# Patient Record
Sex: FEMALE
[De-identification: names, ages, dates, MRNs, and addresses within clinical notes are randomized per-mention and may not be internally consistent; named-entity substitution may affect disease eponyms.]

---

## 2022-04-18 PROBLEM — Z00.00 ENCOUNTER FOR PREVENTIVE HEALTH EXAMINATION: Status: ACTIVE | Noted: 2022-04-18

## 2022-04-25 ENCOUNTER — APPOINTMENT (OUTPATIENT)
Dept: NEUROSURGERY | Facility: CLINIC | Age: 82
End: 2022-04-25
Payer: MEDICARE

## 2022-04-25 ENCOUNTER — TRANSCRIPTION ENCOUNTER (OUTPATIENT)
Age: 82
End: 2022-04-25

## 2022-04-25 DIAGNOSIS — Z86.39 PERSONAL HISTORY OF OTHER ENDOCRINE, NUTRITIONAL AND METABOLIC DISEASE: ICD-10-CM

## 2022-04-25 DIAGNOSIS — Z86.79 PERSONAL HISTORY OF OTHER DISEASES OF THE CIRCULATORY SYSTEM: ICD-10-CM

## 2022-04-25 DIAGNOSIS — M50.30 OTHER CERVICAL DISC DEGENERATION, UNSPECIFIED CERVICAL REGION: ICD-10-CM

## 2022-04-25 PROCEDURE — 99203 OFFICE O/P NEW LOW 30 MIN: CPT | Mod: 95

## 2022-04-25 RX ORDER — APIXABAN 5 MG/1
5 TABLET, FILM COATED ORAL
Refills: 0 | Status: ACTIVE | COMMUNITY

## 2022-04-25 RX ORDER — CHOLECALCIFEROL (VITAMIN D3) 25 MCG
TABLET ORAL
Refills: 0 | Status: ACTIVE | COMMUNITY

## 2022-04-25 RX ORDER — ROSUVASTATIN CALCIUM 5 MG/1
5 TABLET, FILM COATED ORAL
Refills: 0 | Status: ACTIVE | COMMUNITY

## 2022-04-25 RX ORDER — PANTOPRAZOLE 40 MG/1
40 TABLET, DELAYED RELEASE ORAL
Refills: 0 | Status: ACTIVE | COMMUNITY

## 2022-04-25 RX ORDER — LEVOTHYROXINE SODIUM 0.09 MG/1
88 TABLET ORAL
Refills: 0 | Status: ACTIVE | COMMUNITY

## 2022-04-25 NOTE — REASON FOR VISIT
[Home] : at home, [unfilled] , at the time of the visit. [Medical Office: ([facility])___] : at the medical office located in  [Verbal consent obtained from patient] : the patient, [unfilled] [New Patient Visit] : a new patient visit [Spouse] : spouse

## 2022-04-27 NOTE — ASSESSMENT
[FreeTextEntry1] : 82 years old woman with persistent and severe neck pain for over 6 months. Pain radiating own to her left and later right shoulders and arms. Pain is worse with movement and she cannot rotate her neck. She is taking Tramadol with minimal response.  Surgical intervention: C1-C2 (possible C3) posterior fusion discussed. Risks vs Benefit explored  \par \par Plan:\par - Pain management\par - Xray cervical spine in flexion and extension\par - CT cervical spine\par - EMG bilateral UE's\par - DEXA bone desity\par

## 2022-04-27 NOTE — DATA REVIEWED
[de-identified] : MRI Spine Cervical wo Contrast 12/7/2021 - [employer] [email or web address]

## 2022-04-27 NOTE — HISTORY OF PRESENT ILLNESS
[de-identified] : Rose Mary Martin is an 82 year old female with PMH of A. fib/ A. flutter\par referred by Dr. Janet Juarez\par pt would settle for local recommendation as sx and travel would be hard\par She report she began to have persistent and severe  left upper neck pain at least 6 months ago. In the beginning, pain radiates to top of head down to her left shoulder and left arm. However, now  she also has pain in her right sided neck radiating to her right arms down all the way to her fingers. She was evaluated by ortho and was advised that she needs shoulder surgery\par \par She has difficulty moving her neck, especially rotation, lifting her arms and blushing her teeth. Pain is interfering with her ADL's  and pain level can go up to 10/10. 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 No evidence of significant spinal stenosis.\par -C4-C5, C5-C6 severe left greater than right foraminal stenosis\par -C6-C7 severe left foraminal stenosis\par \par She was evaluated by Dr. Janet Lima and was advised surgical intervention: C1-C2 fusion\par \par \par